# Patient Record
Sex: MALE | Race: WHITE | NOT HISPANIC OR LATINO | Employment: OTHER | URBAN - METROPOLITAN AREA
[De-identification: names, ages, dates, MRNs, and addresses within clinical notes are randomized per-mention and may not be internally consistent; named-entity substitution may affect disease eponyms.]

---

## 2019-05-31 ENCOUNTER — EVALUATION (OUTPATIENT)
Dept: PHYSICAL THERAPY | Facility: MEDICAL CENTER | Age: 54
End: 2019-05-31
Payer: COMMERCIAL

## 2019-05-31 ENCOUNTER — TRANSCRIBE ORDERS (OUTPATIENT)
Dept: PHYSICAL THERAPY | Facility: MEDICAL CENTER | Age: 54
End: 2019-05-31

## 2019-05-31 DIAGNOSIS — M25.511 RIGHT SHOULDER PAIN, UNSPECIFIED CHRONICITY: Primary | ICD-10-CM

## 2019-05-31 PROCEDURE — 97110 THERAPEUTIC EXERCISES: CPT | Performed by: PHYSICAL THERAPIST

## 2019-05-31 PROCEDURE — 97162 PT EVAL MOD COMPLEX 30 MIN: CPT | Performed by: PHYSICAL THERAPIST

## 2019-06-03 ENCOUNTER — OFFICE VISIT (OUTPATIENT)
Dept: PHYSICAL THERAPY | Facility: MEDICAL CENTER | Age: 54
End: 2019-06-03
Payer: COMMERCIAL

## 2019-06-03 DIAGNOSIS — M25.511 RIGHT SHOULDER PAIN, UNSPECIFIED CHRONICITY: Primary | ICD-10-CM

## 2019-06-03 PROCEDURE — 97112 NEUROMUSCULAR REEDUCATION: CPT

## 2019-06-03 PROCEDURE — 97110 THERAPEUTIC EXERCISES: CPT

## 2019-06-13 ENCOUNTER — OFFICE VISIT (OUTPATIENT)
Dept: PHYSICAL THERAPY | Facility: MEDICAL CENTER | Age: 54
End: 2019-06-13
Payer: COMMERCIAL

## 2019-06-13 DIAGNOSIS — M25.511 RIGHT SHOULDER PAIN, UNSPECIFIED CHRONICITY: Primary | ICD-10-CM

## 2019-06-13 PROCEDURE — 97112 NEUROMUSCULAR REEDUCATION: CPT

## 2019-06-13 PROCEDURE — 97110 THERAPEUTIC EXERCISES: CPT

## 2019-06-20 ENCOUNTER — OFFICE VISIT (OUTPATIENT)
Dept: PHYSICAL THERAPY | Facility: MEDICAL CENTER | Age: 54
End: 2019-06-20
Payer: COMMERCIAL

## 2019-06-20 DIAGNOSIS — M25.511 RIGHT SHOULDER PAIN, UNSPECIFIED CHRONICITY: Primary | ICD-10-CM

## 2019-06-20 PROCEDURE — 97112 NEUROMUSCULAR REEDUCATION: CPT

## 2019-06-20 PROCEDURE — 97110 THERAPEUTIC EXERCISES: CPT

## 2019-06-24 ENCOUNTER — OFFICE VISIT (OUTPATIENT)
Dept: PHYSICAL THERAPY | Facility: MEDICAL CENTER | Age: 54
End: 2019-06-24
Payer: COMMERCIAL

## 2019-06-24 DIAGNOSIS — M25.511 RIGHT SHOULDER PAIN, UNSPECIFIED CHRONICITY: Primary | ICD-10-CM

## 2019-06-24 PROCEDURE — 97110 THERAPEUTIC EXERCISES: CPT

## 2019-06-24 PROCEDURE — 97112 NEUROMUSCULAR REEDUCATION: CPT

## 2019-06-27 ENCOUNTER — OFFICE VISIT (OUTPATIENT)
Dept: PHYSICAL THERAPY | Facility: MEDICAL CENTER | Age: 54
End: 2019-06-27
Payer: COMMERCIAL

## 2019-06-27 DIAGNOSIS — M25.511 RIGHT SHOULDER PAIN, UNSPECIFIED CHRONICITY: Primary | ICD-10-CM

## 2019-06-27 PROCEDURE — 97110 THERAPEUTIC EXERCISES: CPT | Performed by: PHYSICAL THERAPIST

## 2019-06-27 PROCEDURE — 97112 NEUROMUSCULAR REEDUCATION: CPT | Performed by: PHYSICAL THERAPIST

## 2019-07-08 ENCOUNTER — OFFICE VISIT (OUTPATIENT)
Dept: PHYSICAL THERAPY | Facility: MEDICAL CENTER | Age: 54
End: 2019-07-08
Payer: COMMERCIAL

## 2019-07-08 DIAGNOSIS — M25.511 RIGHT SHOULDER PAIN, UNSPECIFIED CHRONICITY: Primary | ICD-10-CM

## 2019-07-08 PROCEDURE — 97112 NEUROMUSCULAR REEDUCATION: CPT | Performed by: PHYSICAL THERAPIST

## 2019-07-08 PROCEDURE — 97110 THERAPEUTIC EXERCISES: CPT | Performed by: PHYSICAL THERAPIST

## 2019-07-08 NOTE — PROGRESS NOTES
Daily Note     Today's date: 2019  Patient name: Pramod Timmons  : 1965  MRN: [id number]  Referring provider: Kemar Morris DO  Dx:   Encounter Diagnosis     ICD-10-CM    1  Right shoulder pain, unspecified chronicity M25 511          Subjective: Pt reports that he had some pain when he tried to skip rocks during vacation, but other than that his shoulder has been pain free  Objective: See treatment diary below      Assessment: Tolerated treatment well  Patient demonstrated fatigue post treatment, exhibited good technique with therapeutic exercises and would benefit from continued PT  Scap control is slowly improving  Plan: Continue per plan of care        Precautions: None    Daily Treatment Diary       Exercise Diary  5/31 6/3 6/13 6/20 6/24 6/27 7      UBE NV 3F/3B 3F/3B 3F/3B 3F/3B 3F/3B 3F/3B      Scap 4 IP Red  2x10 Red  3x12 Red  3x15 Green  3x10 Green  3x15 Green  3x15      UE alphabet NV 3x  supine 3x  supine 3x  Stand 3x  Stand  home NP  time x2      SL ER NV 3x10 3x12 3x15 1#  3x10 NP  time 2#  3x10      Prone pro/ret NV 3x10 3x10 3x15 3x10 3x10 3x10      Prone raises x3     2x10 3x15 3x10 3x10 3x12      Wall slides     Supine  home NP   time 3x10  stand

## 2019-07-10 ENCOUNTER — OFFICE VISIT (OUTPATIENT)
Dept: PHYSICAL THERAPY | Facility: MEDICAL CENTER | Age: 54
End: 2019-07-10
Payer: COMMERCIAL

## 2019-07-10 DIAGNOSIS — M25.511 RIGHT SHOULDER PAIN, UNSPECIFIED CHRONICITY: Primary | ICD-10-CM

## 2019-07-10 PROCEDURE — 97112 NEUROMUSCULAR REEDUCATION: CPT

## 2019-07-10 PROCEDURE — 97110 THERAPEUTIC EXERCISES: CPT

## 2019-07-10 NOTE — PROGRESS NOTES
Daily Note     Today's date: 7/10/2019  Patient name: Palak Ji  : 1965  MRN: [id number]  Referring provider: Shane Mckeon DO  Dx:   Encounter Diagnosis     ICD-10-CM    1  Right shoulder pain, unspecified chronicity M25 511                   Subjective: Pt reports that his shoulder is a little bit better, but is frustrated that he experienced increased pain when skipping a stone when on vacation  Pt admits that he has not been compliant with his ex's at home  Objective: See treatment diary below      Assessment: Tolerated treatment well  Patient demonstrated fatigue post treatment, exhibited good technique with therapeutic exercises and would benefit from continued PT  Advised pt to perform ex's at home as well for maximum benefit  Plan: Continue per plan of care        Precautions: None    Daily Treatment Diary       Exercise Diary  5/31 6/3 6/13 6/20 6/24 6/27 7/8 7/10     UBE NV 3F/3B 3F/3B 3F/3B 3F/3B 3F/3B 3F/3B 3F/3B     Scap 4 IP Red  2x10 Red  3x12 Red  3x15 Green  3x10 Green  3x15 Green  3x15 Green  3x15     UE alphabet NV 3x  supine 3x  supine 3x  Stand 3x  Stand  home NP  time x2 x3     SL ER NV 3x10 3x12 3x15 1#  3x10 NP  time 2#  3x10 2#  3x12     Prone pro/ret NV 3x10 3x10 3x15 3x10 3x10 3x10 3x12     Prone raises x3     2x10 3x15 3x10 3x10 3x12 3x12     Wall slides     Supine  home NP   time 3x10  stand 3x10  Stand

## 2019-07-15 ENCOUNTER — OFFICE VISIT (OUTPATIENT)
Dept: PHYSICAL THERAPY | Facility: MEDICAL CENTER | Age: 54
End: 2019-07-15
Payer: COMMERCIAL

## 2019-07-15 DIAGNOSIS — M25.511 RIGHT SHOULDER PAIN, UNSPECIFIED CHRONICITY: Primary | ICD-10-CM

## 2019-07-15 PROCEDURE — 97110 THERAPEUTIC EXERCISES: CPT

## 2019-07-15 PROCEDURE — 97112 NEUROMUSCULAR REEDUCATION: CPT

## 2019-07-15 NOTE — PROGRESS NOTES
Daily Note     Today's date: 7/15/2019  Patient name: Debbie Gomez  : 1965  MRN: [id number]  Referring provider: Jo Tomas DO  Dx:   Encounter Diagnosis     ICD-10-CM    1  Right shoulder pain, unspecified chronicity M25 511                   Subjective: Pt reports no changes in his shoulder since LV  Objective: See treatment diary below      Assessment: Tolerated treatment well  Patient demonstrated fatigue post treatment, exhibited good technique with therapeutic exercises and would benefit from continued PT  Occasional cuing required for scap mm activation with TBand ex's  Pt could not complete all ex's due to time constraints  Plan: Continue per plan of care        Precautions: None    Daily Treatment Diary       Exercise Diary  5/31 6/3 6/13 6/20 6/24 6/27 7/8 7/10 7/15    UBE NV 3F/3B 3F/3B 3F/3B 3F/3B 3F/3B 3F/3B 3F/3B 3F/3B    Scap 4 IP Red  2x10 Red  3x12 Red  3x15 Green  3x10 Green  3x15 Green  3x15 Green  3x15 Green  3x15    UE alphabet NV 3x  supine 3x  supine 3x  Stand 3x  Stand  home NP  time x2 x3 np    SL ER NV 3x10 3x12 3x15 1#  3x10 NP  time 2#  3x10 2#  3x12 2#  3x12    Prone pro/ret NV 3x10 3x10 3x15 3x10 3x10 3x10 3x12 3x12    Prone raises x3     2x10 3x15 3x10 3x10 3x12 3x12 3x12    Wall slides     Supine  home NP   time 3x10  stand 3x10  Stand np

## 2019-07-17 ENCOUNTER — OFFICE VISIT (OUTPATIENT)
Dept: PHYSICAL THERAPY | Facility: MEDICAL CENTER | Age: 54
End: 2019-07-17
Payer: COMMERCIAL

## 2019-07-17 DIAGNOSIS — M25.511 RIGHT SHOULDER PAIN, UNSPECIFIED CHRONICITY: Primary | ICD-10-CM

## 2019-07-17 PROCEDURE — 97112 NEUROMUSCULAR REEDUCATION: CPT

## 2019-07-17 PROCEDURE — 97110 THERAPEUTIC EXERCISES: CPT

## 2019-07-17 NOTE — PROGRESS NOTES
Daily Note     Today's date: 2019  Patient name: Josie Ronquillo  : 1965  MRN: [id number]  Referring provider: Louie Field DO  Dx:   Encounter Diagnosis     ICD-10-CM    1  Right shoulder pain, unspecified chronicity M25 511                   Subjective: Pt reports that his shoulder feels pretty good and has no new comments to offer  Objective: See treatment diary below      Assessment: Tolerated treatment well  Patient demonstrated fatigue post treatment, exhibited good technique with therapeutic exercises and would benefit from continued PT  Pt remains challenged w/current tx plan  Plan: Continue per plan of care        Precautions: None    Daily Treatment Diary       Exercise Diary  5/31 6/3 6/13 6/20 6/24 6/27 7/ 7/10 7/15 7/17   UBE NV 3F/3B 3F/3B 3F/3B 3F/3B 3F/3B 3F/3B 3F/3B 3F/3B 3F/3B   Scap 4 IP Red  2x10 Red  3x12 Red  3x15 Green  3x10 Green  3x15 Green  3x15 Green  3x15 Green  3x15 Green  3x15   UE alphabet NV 3x  supine 3x  supine 3x  Stand 3x  Stand  home NP  time x2 x3 np Stand  3x   SL ER NV 3x10 3x12 3x15 1#  3x10 NP  time 2#  3x10 2#  3x12 2#  3x12 2#  3x12   Prone pro/ret NV 3x10 3x10 3x15 3x10 3x10 3x10 3x12 3x12 3x12   Prone raises x3     2x10 3x15 3x10 3x10 3x12 3x12 3x12 3x12   Wall slides     Supine  home NP   time 3x10  stand 3x10  Stand np 3x10

## 2019-07-22 ENCOUNTER — OFFICE VISIT (OUTPATIENT)
Dept: PHYSICAL THERAPY | Facility: MEDICAL CENTER | Age: 54
End: 2019-07-22
Payer: COMMERCIAL

## 2019-07-22 DIAGNOSIS — M25.511 RIGHT SHOULDER PAIN, UNSPECIFIED CHRONICITY: Primary | ICD-10-CM

## 2019-07-22 PROCEDURE — 97110 THERAPEUTIC EXERCISES: CPT | Performed by: PHYSICAL THERAPIST

## 2019-07-22 PROCEDURE — 97112 NEUROMUSCULAR REEDUCATION: CPT | Performed by: PHYSICAL THERAPIST

## 2019-07-22 NOTE — PROGRESS NOTES
Daily Note     Today's date: 2019  Patient name: Kiah Palomo  : 1965  MRN: [id number]  Referring provider: Araceli Chavis DO  Dx:   Encounter Diagnosis     ICD-10-CM    1  Right shoulder pain, unspecified chronicity M25 511          Subjective: Pt reports that his R shoulder is sore  Pt admits to not doing his HEP and understands that his progress will be slow and he many never completely get rid of the soreness if he does not do his HEP  Objective: See treatment diary below      Assessment: Tolerated treatment well  Patient demonstrated fatigue post treatment, exhibited good technique with therapeutic exercises and would benefit from continued PT  Pt would benefit from doing his HEP more consistently  The likelihood of complete resolution of his sxs is decreased without consistent HEP performance  Plan: Continue per plan of care        Precautions: None    Daily Treatment Diary       Exercise Diary              UBE 3F/3B            Scap 4 Blue  3x10            UE alphabet 3X            SL ER 2#  3x15            Prone pro/ret 3x10            Prone raises x3   3x15            Wall slides 3x15

## 2019-07-24 ENCOUNTER — OFFICE VISIT (OUTPATIENT)
Dept: PHYSICAL THERAPY | Facility: MEDICAL CENTER | Age: 54
End: 2019-07-24
Payer: COMMERCIAL

## 2019-07-24 DIAGNOSIS — M25.511 RIGHT SHOULDER PAIN, UNSPECIFIED CHRONICITY: Primary | ICD-10-CM

## 2019-07-24 PROCEDURE — G8991 OTHER PT/OT GOAL STATUS: HCPCS

## 2019-07-24 PROCEDURE — 97110 THERAPEUTIC EXERCISES: CPT

## 2019-07-24 PROCEDURE — G8992 OTHER PT/OT  D/C STATUS: HCPCS

## 2019-07-24 PROCEDURE — 97112 NEUROMUSCULAR REEDUCATION: CPT

## 2019-07-24 NOTE — PROGRESS NOTES
Daily Note     Today's date: 2019  Patient name: Kiah Palomo  : 1965  MRN: [id number]  Referring provider: Araceli Chavis DO  Dx:   Encounter Diagnosis     ICD-10-CM    1  Right shoulder pain, unspecified chronicity M25 511                   Subjective: Pt reports that his shoulder is feeling pretty good and knows that he must continue his hep for continued results  Objective: See treatment diary below      Assessment: Tolerated treatment well  Patient demonstrated fatigue post treatment and exhibited good technique with therapeutic exercises      Plan: Pt dc'd to hep after todays visit        Precautions: None    Daily Treatment Diary       Exercise Diary             UBE 3F/3B 3F/3B           Scap 4 Blue  3x10 Blue  3x12           UE alphabet 3X 3x           SL ER 2#  3x15 2#  3x15           Prone pro/ret 3x10 3x10           Prone raises x3   3x15 3x15           Wall slides 3x15 3x15

## 2019-07-29 ENCOUNTER — APPOINTMENT (OUTPATIENT)
Dept: PHYSICAL THERAPY | Facility: MEDICAL CENTER | Age: 54
End: 2019-07-29
Payer: COMMERCIAL

## 2019-07-31 ENCOUNTER — APPOINTMENT (OUTPATIENT)
Dept: PHYSICAL THERAPY | Facility: MEDICAL CENTER | Age: 54
End: 2019-07-31
Payer: COMMERCIAL

## 2024-02-08 ENCOUNTER — OFFICE VISIT (OUTPATIENT)
Dept: URGENT CARE | Facility: MEDICAL CENTER | Age: 59
End: 2024-02-08
Payer: COMMERCIAL

## 2024-02-08 VITALS
HEART RATE: 72 BPM | SYSTOLIC BLOOD PRESSURE: 123 MMHG | TEMPERATURE: 98.2 F | DIASTOLIC BLOOD PRESSURE: 72 MMHG | OXYGEN SATURATION: 98 % | RESPIRATION RATE: 18 BRPM

## 2024-02-08 DIAGNOSIS — B96.89 BACTERIAL SINUSITIS: Primary | ICD-10-CM

## 2024-02-08 DIAGNOSIS — R05.1 ACUTE COUGH: ICD-10-CM

## 2024-02-08 DIAGNOSIS — J32.9 BACTERIAL SINUSITIS: Primary | ICD-10-CM

## 2024-02-08 PROCEDURE — 99213 OFFICE O/P EST LOW 20 MIN: CPT | Performed by: NURSE PRACTITIONER

## 2024-02-08 RX ORDER — CETIRIZINE HYDROCHLORIDE 5 MG/1
5 TABLET, CHEWABLE ORAL DAILY
COMMUNITY

## 2024-02-08 RX ORDER — FLUTICASONE PROPIONATE 50 MCG
1 SPRAY, SUSPENSION (ML) NASAL DAILY
COMMUNITY

## 2024-02-08 RX ORDER — BENZONATATE 200 MG/1
200 CAPSULE ORAL 3 TIMES DAILY PRN
Qty: 20 CAPSULE | Refills: 0 | Status: SHIPPED | OUTPATIENT
Start: 2024-02-08

## 2024-02-08 RX ORDER — AMOXICILLIN AND CLAVULANATE POTASSIUM 875; 125 MG/1; MG/1
1 TABLET, FILM COATED ORAL EVERY 12 HOURS SCHEDULED
Qty: 14 TABLET | Refills: 0 | Status: SHIPPED | OUTPATIENT
Start: 2024-02-08 | End: 2024-02-15

## 2024-02-08 NOTE — PATIENT INSTRUCTIONS
Take zyrtec or allegra daily  Use flonase 1-2 sprays in each nare daily   Use nasal saline to the nose,   Use humidifer in room  Symptoms worsen go to ER  Rest    Take antibiotic for your sinus infection   Continue symptomatic treatment   Take tessalon perles for cough.

## 2024-02-08 NOTE — PROGRESS NOTES
NAME: Ancelmo Taylor is a 58 y.o. male  : 1965    MRN: [id number]    /72   Pulse 72   Temp 98.2 °F (36.8 °C)   Resp 18   SpO2 98%     2:57 PM    Assessment and Plan   Bacterial sinusitis [J32.9, B96.89]  1. Bacterial sinusitis  amoxicillin-clavulanate (AUGMENTIN) 875-125 mg per tablet      2. Acute cough  benzonatate (TESSALON) 200 MG capsule          Ancelmo was seen today for cough.    Diagnoses and all orders for this visit:    Bacterial sinusitis  -     amoxicillin-clavulanate (AUGMENTIN) 875-125 mg per tablet; Take 1 tablet by mouth every 12 (twelve) hours for 7 days    Acute cough  -     benzonatate (TESSALON) 200 MG capsule; Take 1 capsule (200 mg total) by mouth 3 (three) times a day as needed for cough        Patient Instructions     Patient Instructions   Take zyrtec or allegra daily  Use flonase 1-2 sprays in each nare daily   Use nasal saline to the nose,   Use humidifer in room  Symptoms worsen go to ER  Rest    Take antibiotic for your sinus infection   Continue symptomatic treatment   Take tessalon perles for cough.      Proceed to the nearest ER if symptoms worsen, Follow up with your PCP  Continue to social distance, wash your hands, and wear your masks. Please continue to follow the CDC.gov guidelines daily for they are subject to change on COVID-19    Chief Complaint     Chief Complaint   Patient presents with    Cough     Pt states he had a headcold one week ago which improved but now  has sinus pressure with a productive cough for yellow sputum.  Self test for Covid negative last night.          History of Present Illness     58 yr old male here today for sinus pain, fever, cough and congestion. Pt has had a fever 3 days ago, took a covid test and was negative. Took zyrtec and Flonase daily. Continue to use tylenol and motrin for pain.  Denies CP, SOB, nausea or vomiting.             Review of Systems   Review of Systems   Constitutional:  Positive for fever. Negative for activity  change, appetite change, chills and fatigue.   HENT:  Positive for congestion, ear pain, postnasal drip, rhinorrhea, sinus pressure, sinus pain and sore throat. Negative for ear discharge and sneezing.    Eyes:  Negative for pain.   Respiratory:  Positive for cough. Negative for chest tightness, shortness of breath and wheezing.    Cardiovascular: Negative.    Gastrointestinal:  Negative for nausea.   Genitourinary: Negative.    Musculoskeletal: Negative.  Negative for neck pain.   Skin: Negative.    Allergic/Immunologic: Environmental allergies: Sinus.   Neurological:  Negative for headaches.   Psychiatric/Behavioral: Negative.           Current Medications       Current Outpatient Medications:     amoxicillin-clavulanate (AUGMENTIN) 875-125 mg per tablet, Take 1 tablet by mouth every 12 (twelve) hours for 7 days, Disp: 14 tablet, Rfl: 0    benzonatate (TESSALON) 200 MG capsule, Take 1 capsule (200 mg total) by mouth 3 (three) times a day as needed for cough, Disp: 20 capsule, Rfl: 0    cetirizine (ZyrTEC) 5 MG chewable tablet, Chew 5 mg daily, Disp: , Rfl:     fluticasone (FLONASE) 50 mcg/act nasal spray, 1 spray into each nostril daily, Disp: , Rfl:     fluorouracil (EFUDEX) 5 % cream, Apply topically 2 (two) times a day (Patient not taking: Reported on 2/8/2024), Disp: , Rfl:     Current Allergies     Allergies as of 02/08/2024    (No Known Allergies)              Past Medical History:   Diagnosis Date    Allergic rhinitis     Irregular heart beat        Past Surgical History:   Procedure Laterality Date    CARDIAC CATHETERIZATION      HERNIA REPAIR      KNEE SURGERY Left 1985 2015    NASAL TURBINATE REDUCTION      2013; [place]    SEPTOPLASTY      2013; [place]    SKIN CANCER EXCISION Left 02/2023    [facility]    THUMB FUSION         Family History   Problem Relation Age of Onset    Hyperlipidemia Mother     Osteopenia Mother     Hypertension Father     Hyperlipidemia Brother     Diabetes Paternal Grandmother      Diabetes Paternal Grandfather     Dementia Paternal Grandfather          Medications have been verified.    The following portions of the patient's history were reviewed and updated as appropriate: allergies, current medications, past family history, past medical history, past social history, past surgical history and problem list.    Objective   /72   Pulse 72   Temp 98.2 °F (36.8 °C)   Resp 18   SpO2 98%      Physical Exam     Physical Exam  Constitutional:       General: He is awake.      Appearance: He is well-developed. He is not ill-appearing.   HENT:      Head: Normocephalic.      Right Ear: Hearing, tympanic membrane, ear canal and external ear normal. There is no impacted cerumen.      Left Ear: Hearing, tympanic membrane, ear canal and external ear normal. There is no impacted cerumen.      Nose: Mucosal edema, congestion and rhinorrhea present.      Right Turbinates: Swollen.      Left Turbinates: Swollen.      Right Sinus: Maxillary sinus tenderness present.      Left Sinus: Maxillary sinus tenderness present.      Mouth/Throat:      Lips: Pink.      Mouth: Mucous membranes are moist.      Pharynx: Uvula midline. Posterior oropharyngeal erythema present. No oropharyngeal exudate.      Tonsils: No tonsillar exudate.   Eyes:      Pupils: Pupils are equal, round, and reactive to light.   Cardiovascular:      Rate and Rhythm: Normal rate and regular rhythm.      Heart sounds: Normal heart sounds. No murmur heard.  Pulmonary:      Effort: Pulmonary effort is normal.      Breath sounds: Normal breath sounds and air entry. No decreased breath sounds, wheezing, rhonchi or rales.   Musculoskeletal:         General: Normal range of motion.      Cervical back: Normal range of motion.   Skin:     General: Skin is warm.      Capillary Refill: Capillary refill takes less than 2 seconds.   Neurological:      General: No focal deficit present.      Mental Status: He is alert and oriented to person, place, and  "time.   Psychiatric:         Behavior: Behavior is cooperative.               Note: Portions of this record may have been created with voice recognition software. Occasional wrong word or \"sound a like\" substitutions may have occurred due to the inherent limitations of voice recognition software. Please read the chart carefully and recognize, using context, where substitutions have occurred.*    GIL Oden  "

## 2024-08-22 ENCOUNTER — TELEPHONE (OUTPATIENT)
Dept: ADMINISTRATIVE | Facility: OTHER | Age: 59
End: 2024-08-22

## 2024-08-22 ENCOUNTER — OFFICE VISIT (OUTPATIENT)
Dept: FAMILY MEDICINE CLINIC | Facility: CLINIC | Age: 59
End: 2024-08-22
Payer: COMMERCIAL

## 2024-08-22 VITALS
HEIGHT: 78 IN | BODY MASS INDEX: 21.64 KG/M2 | WEIGHT: 187 LBS | TEMPERATURE: 97.6 F | OXYGEN SATURATION: 99 % | RESPIRATION RATE: 20 BRPM | SYSTOLIC BLOOD PRESSURE: 128 MMHG | DIASTOLIC BLOOD PRESSURE: 62 MMHG | HEART RATE: 69 BPM

## 2024-08-22 DIAGNOSIS — H00.014 HORDEOLUM EXTERNUM OF LEFT UPPER EYELID: Primary | ICD-10-CM

## 2024-08-22 DIAGNOSIS — I63.20 CEREBROVASCULAR ACCIDENT (CVA) DUE TO OCCLUSION OF PRECEREBRAL ARTERY (HCC): ICD-10-CM

## 2024-08-22 DIAGNOSIS — Z12.5 SCREENING FOR PROSTATE CANCER: ICD-10-CM

## 2024-08-22 DIAGNOSIS — I47.29 NSVT (NONSUSTAINED VENTRICULAR TACHYCARDIA) (HCC): ICD-10-CM

## 2024-08-22 DIAGNOSIS — E53.8 B12 DEFICIENCY: ICD-10-CM

## 2024-08-22 PROBLEM — Q24.5 MYOCARDIAL BRIDGE: Status: RESOLVED | Noted: 2024-08-22 | Resolved: 2024-08-22

## 2024-08-22 PROBLEM — I63.40 CEREBROVASCULAR ACCIDENT (CVA) DUE TO EMBOLISM OF CEREBRAL ARTERY (HCC): Status: ACTIVE | Noted: 2024-08-22

## 2024-08-22 PROBLEM — I63.40 CEREBROVASCULAR ACCIDENT (CVA) DUE TO EMBOLISM OF CEREBRAL ARTERY (HCC): Status: RESOLVED | Noted: 2024-08-22 | Resolved: 2024-08-22

## 2024-08-22 PROBLEM — Q24.5 MYOCARDIAL BRIDGE: Status: ACTIVE | Noted: 2024-08-22

## 2024-08-22 PROCEDURE — 99204 OFFICE O/P NEW MOD 45 MIN: CPT | Performed by: FAMILY MEDICINE

## 2024-08-22 RX ORDER — POLYMYXIN B SULFATE AND TRIMETHOPRIM 1; 10000 MG/ML; [USP'U]/ML
1 SOLUTION OPHTHALMIC EVERY 6 HOURS
Qty: 10 ML | Refills: 0 | Status: SHIPPED | OUTPATIENT
Start: 2024-08-22 | End: 2024-08-29

## 2024-08-22 RX ORDER — LANOLIN ALCOHOL/MO/W.PET/CERES
1000 CREAM (GRAM) TOPICAL DAILY
COMMUNITY

## 2024-08-22 RX ORDER — ATORVASTATIN CALCIUM 80 MG/1
80 TABLET, FILM COATED ORAL DAILY
COMMUNITY

## 2024-08-22 RX ORDER — CLOPIDOGREL BISULFATE 75 MG/1
75 TABLET ORAL DAILY
COMMUNITY

## 2024-08-22 RX ORDER — ASPIRIN 81 MG/1
81 TABLET, CHEWABLE ORAL DAILY
COMMUNITY

## 2024-08-22 NOTE — LETTER
Procedure Request Form: Colonoscopy      Date Requested: 24  Patient: Ancelmo Taylor  Patient : 1965   Referring Provider: Isreal Chisholm MD        Date of Procedure ______________________________       The above patient has informed us that they have completed their   most recent Colonoscopy at your facility. Please complete   this form and attach all corresponding procedure reports/results.    Comments ______ DOS 2016 ___________________________________________  ____________________________________________________________________  ____________________________________________________________________  ____________________________________________________________________    Facility Completing Procedure _________________________________________    Form Completed By (print name) _______________________________________      Signature __________________________________________________________      These reports are needed for  compliance.    Please fax this completed form and a copy of the procedure report to our office located at [address] [place] [address] as soon as possible to Fax [phone] attention Massimo: Phone [phone]    We thank you for your assistance in treating our mutual patient.

## 2024-08-22 NOTE — TELEPHONE ENCOUNTER
----- Message from Nitesh RAYMOND sent at 8/22/2024 11:09 AM EDT -----  08/22/24 11:11 AM    Hello, our patient Ancelmo Taylor has had CRC: Colonoscopy completed/performed. Please assist in updating the patient chart by making an External outreach to [facility] facility located in [facility] [address] [place] [address]. The date of service is sometimes in 2016.    Thank you,  Nitesh Tristan MA  [facility]

## 2024-08-22 NOTE — PROGRESS NOTES
Ambulatory Visit  Name: Ancelmo Taylor      : 1965      MRN: [id number]  Encounter Provider: Isreal Chisholm MD  Encounter Date: 2024   Encounter department: [facility]    Assessment & Plan   1. Hordeolum externum of left upper eyelid  -     polymyxin b-trimethoprim (POLYTRIM) ophthalmic solution; Administer 1 drop to both eyes every 6 (six) hours for 7 days  2. Cerebrovascular accident (CVA) due to occlusion of precerebral artery (HCC)  Assessment & Plan:  Remain on current medicaiton, follow up neurology, follow up cardiology, recheck labs in 3 months  Orders:  -     Ambulatory Referral to Cardiology; Future  -     Lipid panel; Future; Expected date: 2024  -     Comprehensive metabolic panel; Future; Expected date: 2024  -     Hemoglobin A1C; Future; Expected date: 2024  3. NSVT (nonsustained ventricular tachycardia) (HCC)  Assessment & Plan:  Follows with cardiology, previous EP study negative  Orders:  -     Ambulatory Referral to Cardiology; Future  4. Screening for prostate cancer  -     PSA, Total Screen; Future  5. B12 deficiency  -     Vitamin B12; Future; Expected date: 2024         History of Present Illness     HPI    59 y.o. male with a PMH of Migraines and Atrial Tachycardia who presented to [facility] on 8/10/2024 9:47 AM with complaint of slurred speech, dizziness, right upper and lower extremity numbness and left ear pain.     He presented to the ED as a stroke alert. Upon arrival, his vitals were stable on room air. EKG revealed NSR. CBC and CMP were unremarkable.Troponins were negative. CT Head was negative. Neurology was consulted and he was started on ASA, Plavix, and Atorvastatin. MRI Brain with right superior cerebellum acute vs early subacute infarction with concern for associated artifact vs 3 mm degenerative hemorrhage. MRA Head/Neck revealed mild small vessel disease and concern for severe stenosis vs  occlusion of the right vertebral artery. ECHO was unremarkable. He was given an order for a 4 week Ziopatch.    Needs Zio patch after CVA, was prescribed by out of network cardioligst and denied by insurance    Since discahrarge, has noted speech and arm strength returning to normal.    He has noticed a stye and ischarge from his left eye for hte last month  Taking all medications as prescribed in hospital.    Review of Systems   Constitutional:  Negative for activity change and appetite change.   Respiratory:  Negative for apnea and chest tightness.    Cardiovascular:  Negative for chest pain and palpitations.   Gastrointestinal:  Negative for abdominal distention and abdominal pain.   Neurological:  Positive for dizziness.     Past Medical History:   Diagnosis Date    Allergic rhinitis     Irregular heart beat      Past Surgical History:   Procedure Laterality Date    CARDIAC CATHETERIZATION      HERNIA REPAIR      KNEE SURGERY Left 1985 2015    NASAL TURBINATE REDUCTION      2013; [place]    SEPTOPLASTY      2013; [place]    SKIN CANCER EXCISION Left 02/2023    [facility]    THUMB FUSION       Family History   Problem Relation Age of Onset    Hyperlipidemia Mother     Osteopenia Mother     Hypertension Father     Hyperlipidemia Brother     Diabetes Paternal Grandmother     Diabetes Paternal Grandfather     Dementia Paternal Grandfather      Social History     Tobacco Use    Smoking status: Never    Smokeless tobacco: Never   Vaping Use    Vaping status: Never Used   Substance and Sexual Activity    Alcohol use: Yes    Drug use: Never    Sexual activity: Not on file     Current Outpatient Medications on File Prior to Visit   Medication Sig    aspirin 81 mg chewable tablet Chew 81 mg daily    atorvastatin (LIPITOR) 80 mg tablet Take 80 mg by mouth daily    clopidogrel (PLAVIX) 75 mg tablet Take 75 mg by mouth daily    fluticasone (FLONASE) 50 mcg/act nasal spray 1 spray into each nostril daily    vitamin B-12 (VITAMIN  "B-12) 1,000 mcg tablet Take 1,000 mcg by mouth in the morning    [DISCONTINUED] cetirizine (ZyrTEC) 5 MG chewable tablet Chew 5 mg daily    [DISCONTINUED] benzonatate (TESSALON) 200 MG capsule Take 1 capsule (200 mg total) by mouth 3 (three) times a day as needed for cough    [DISCONTINUED] fluorouracil (EFUDEX) 5 % cream Apply topically 2 (two) times a day (Patient not taking: Reported on 2/8/2024)     No Known Allergies  Immunization History   Administered Date(s) Administered    COVID-19 MODERNA VACC 0.5 ML IM 03/17/2021, 04/14/2021, 11/29/2021, 08/01/2022     Objective     /62 (BP Location: Right arm, Patient Position: Sitting, Cuff Size: Standard)   Pulse 69   Temp 97.6 °F (36.4 °C) (Tympanic)   Resp 20   Ht 6' 11\" (2.108 m)   Wt 84.8 kg (187 lb)   SpO2 99%   BMI 19.08 kg/m²     Physical Exam  Constitutional:       Appearance: Normal appearance.   HENT:      Nose: Nose normal.   Eyes:      Pupils: Pupils are equal, round, and reactive to light.      Comments: Stye left lower eyelid   Cardiovascular:      Rate and Rhythm: Normal rate and regular rhythm.      Pulses: Normal pulses.      Heart sounds: Normal heart sounds.   Abdominal:      General: Abdomen is flat.      Tenderness: There is no abdominal tenderness.   Musculoskeletal:         General: Normal range of motion.   Skin:     General: Skin is warm.   Neurological:      General: No focal deficit present.      Mental Status: He is alert.         "

## 2024-08-22 NOTE — LETTER
Procedure Request Form: Colonoscopy      Date Requested: 24  Patient: Ancelmo Taylor  Patient : 1965   Referring Provider: Isreal Chisholm MD        Date of Procedure ______________________________       The above patient has informed us that they have completed their   most recent Colonoscopy at your facility. Please complete   this form and attach all corresponding procedure reports/results.    Comments ____________ DOS 2016 _____________________________________  ____________________________________________________________________  ____________________________________________________________________  ____________________________________________________________________    Facility Completing Procedure _________________________________________    Form Completed By (print name) _______________________________________      Signature __________________________________________________________      These reports are needed for  compliance.    Please fax this completed form and a copy of the procedure report to our office located at [address] [place] [address] as soon as possible to Fax [phone] attention Massimo: Phone [phone]    We thank you for your assistance in treating our mutual patient.

## 2024-08-22 NOTE — TELEPHONE ENCOUNTER
Upon review of the In Basket request and the patient's chart, initial outreach has been made via fax to facility. Please see Contacts section for details.     Thank you  Massimo Bassett MA

## 2024-08-29 NOTE — TELEPHONE ENCOUNTER
As a final attempt, a third outreach has been made via fax to facility. Please see Contacts section for details. This encounter will be closed and completed by end of day. Should we receive the requested information because of previous outreach attempts, the requested patient's chart will be updated appropriately.     Thank you  Massimo Bassett MA

## 2024-08-29 NOTE — TELEPHONE ENCOUNTER
As a follow-up, a second attempt has been made for outreach via telephone call to facility. Please see Contacts section for details.    Thank you  Massimo Bassett MA

## 2024-08-29 NOTE — TELEPHONE ENCOUNTER
Upon review of the In Basket request we were able to locate, review, and update the patient chart as requested for CRC: Colonoscopy.    Any additional questions or concerns should be emailed to the Practice Liaisons via the appropriate education email address, please do not reply via In Basket.    Thank you  Massimo Bassett MA   [facility]

## 2024-09-06 ENCOUNTER — TELEPHONE (OUTPATIENT)
Age: 59
End: 2024-09-06

## 2024-09-06 ENCOUNTER — NURSE TRIAGE (OUTPATIENT)
Age: 59
End: 2024-09-06

## 2024-09-06 DIAGNOSIS — I63.20 CEREBROVASCULAR ACCIDENT (CVA) DUE TO OCCLUSION OF PRECEREBRAL ARTERY (HCC): Primary | ICD-10-CM

## 2024-09-06 NOTE — TELEPHONE ENCOUNTER
"Called pt and states that he started plavix 75 mg daily 2 weeks ago. After a week of taking it, he noticed minor nosebleed. Few days ago, he noticed, nosebleed was getting worse. Needed a couple of tissues.   Lightheadedness and vertigo started 1 week before he started plavix. Lightheadedness comes and goes.  Vertigo gets better as soon as he does not turn his head. It takes a while like a couple of hours until he is back to his baseline.  Pt has NP appt w/ Dr Wallace on 10/2/24.  Pt wants to know Dr Wallace's recommendation    Answer Assessment - Initial Assessment Questions  1. AMOUNT OF BLEEDING:       - MILD: needed a couple tissues. Last about 3 min        2. ONSET: \"When did the nosebleed start?\"       2 weeks ago    3. FREQUENCY: \"How many nosebleeds have you had in the last 24 hours?\"       2    4. RECURRENT SYMPTOMS: \"Have there been other recent nosebleeds?\" If Yes, ask: \"How long did it take you to stop the bleeding?\" \"What worked best?\"       Denies    5. CAUSE: \"What do you think caused this nosebleed?\"      Possibly plavix    6. LOCAL FACTORS: \"Do you have any cold symptoms?\", \"Have you been rubbing or picking at your nose?\"      Denies    7. SYSTEMIC FACTORS: \"Do you have high blood pressure or any bleeding problems?\"      Denies    8. BLOOD THINNERS: \"Do you take any blood thinners?\" (e.g., coumadin, heparin, aspirin, Plavix)      Baby 81 mg and plavix 75 mg daily    9. OTHER SYMPTOMS: \"Do you have any other symptoms?\" (e.g., lightheadedness)      Vertigo it gets better when he does not turn his head and lightheadedness, it comes and goes. Both  symptoms started 3 weeks ago.     10. PREGNANCY: \"Is there any chance you are pregnant?\" \"When was your last menstrual period?\"        N/a    Protocols used: Nosebleed-ADULT-[place]      Ok to leave detailed message   "

## 2024-09-06 NOTE — TELEPHONE ENCOUNTER
Regarding: MEDICATION SIDE EFFECT  ----- Message from Keke VELIZ sent at 9/6/2024  9:27 AM EDT -----  09/06/24    Patient called office With Medication Side Effects.    Side Effects are the following:   Nosebleed and Vertigo.     Patient stated that side effects are happening since he started taking Medication that were prescribed at the ED 08/10/24.    Patient is scheduled to see Neurology for 10/02/24, 9:30 AM With Dr. Wallace at the [place] location.    Please Contact Patient.   Thank You.

## 2024-09-06 NOTE — TELEPHONE ENCOUNTER
Patient spoke to neuro regarding Plavix causing Vertigo and slight nose bleeds that last a few minutes long. They instructed him to speak to his PCP regarding this issue. Please advise.

## 2024-10-02 ENCOUNTER — PATIENT MESSAGE (OUTPATIENT)
Dept: NEUROLOGY | Facility: CLINIC | Age: 59
End: 2024-10-02

## 2024-10-02 ENCOUNTER — OFFICE VISIT (OUTPATIENT)
Dept: NEUROLOGY | Facility: CLINIC | Age: 59
End: 2024-10-02
Payer: COMMERCIAL

## 2024-10-02 VITALS
BODY MASS INDEX: 22.19 KG/M2 | TEMPERATURE: 97.8 F | HEART RATE: 75 BPM | DIASTOLIC BLOOD PRESSURE: 78 MMHG | SYSTOLIC BLOOD PRESSURE: 114 MMHG | OXYGEN SATURATION: 96 % | HEIGHT: 78 IN | WEIGHT: 191.8 LBS

## 2024-10-02 DIAGNOSIS — I47.29 NSVT (NONSUSTAINED VENTRICULAR TACHYCARDIA) (HCC): Primary | ICD-10-CM

## 2024-10-02 DIAGNOSIS — R06.83 SNORING: ICD-10-CM

## 2024-10-02 DIAGNOSIS — R53.83 FATIGUE: ICD-10-CM

## 2024-10-02 DIAGNOSIS — I65.09 VERTEBRAL ARTERY STENOSIS: ICD-10-CM

## 2024-10-02 DIAGNOSIS — I63.20 CEREBROVASCULAR ACCIDENT (CVA) DUE TO OCCLUSION OF PRECEREBRAL ARTERY (HCC): ICD-10-CM

## 2024-10-02 PROCEDURE — 99204 OFFICE O/P NEW MOD 45 MIN: CPT | Performed by: PSYCHIATRY & NEUROLOGY

## 2024-10-02 RX ORDER — VITAMIN B COMPLEX
1 CAPSULE ORAL DAILY
Qty: 30 CAPSULE | Refills: 3 | Status: SHIPPED | OUTPATIENT
Start: 2024-10-02

## 2024-10-02 RX ORDER — ATORVASTATIN CALCIUM 40 MG/1
40 TABLET, FILM COATED ORAL DAILY
Qty: 30 TABLET | Refills: 2 | Status: SHIPPED | OUTPATIENT
Start: 2024-10-02 | End: 2024-10-03 | Stop reason: SDUPTHER

## 2024-10-02 NOTE — ASSESSMENT & PLAN NOTE
-for secondary stroke prevention, recommend continuation of combination of aspirin, plavix 75mg Po qdaily and lipitor  -request imaging, and decide regarding dual antiplatelet therapy based on imaging, will likely d/c plavix if vertebral artery stenosis is more hypoplastic.   -Blood Pressure goal < 130/80, BP is at goal.   -LDL goal <70  -snoring - no TESSA  -will refer patient to cardiology for zio patch, and loop recorder placement     Counseling/stroke education -   -I advised patient to avoid using NSAIDs for headaches or other pain and to stick to tylenol if needed  -Recommend lifestyle modifications such as mediterranean diet & regular exercise regimen atleast 4-5 times a week for 20-30 minutes.   -I educated patient/family regarding medication compliance  -encourage control of diabetes and hypertension; defer management to primary   -refer to PT, OT   -cardiology referral  -sleep medicine referral  Orders:    Ambulatory Referral to Physical Therapy; Future    Ambulatory Referral to Occupational Therapy; Future    Ambulatory Referral to Cardiology; Future    Ambulatory Referral to Sleep Medicine; Future

## 2024-10-02 NOTE — PATIENT INSTRUCTIONS
request imaging, and decide regarding dual antiplatelet therapy based on imaging, will likely d/c plavix if vertebral artery stenosis is more hypoplastic.   Continue with aspirin   B complex vitamins and coenyzme q10 for fatigue

## 2024-10-02 NOTE — PROGRESS NOTES
Ambulatory Visit  Name: Ancelmo Taylor      : 1965      MRN: [id number]  Encounter Provider: Elo Wallace MD  Encounter Date: 10/2/2024   Encounter department: [facility]      Assessment & Plan  NSVT (nonsustained ventricular tachycardia) (HCC)         Cerebrovascular accident (CVA) due to occlusion of precerebral artery (HCC)  -for secondary stroke prevention, recommend continuation of combination of aspirin, plavix 75mg Po qdaily and lipitor  -request imaging, and decide regarding dual antiplatelet therapy based on imaging, will likely d/c plavix if vertebral artery stenosis is more hypoplastic.   -Blood Pressure goal < 130/80, BP is at goal.   -LDL goal <70  -snoring - no TESSA  -will refer patient to cardiology for zio patch, and loop recorder placement     Counseling/stroke education -   -I advised patient to avoid using NSAIDs for headaches or other pain and to stick to tylenol if needed  -Recommend lifestyle modifications such as mediterranean diet & regular exercise regimen atleast 4-5 times a week for 20-30 minutes.   -I educated patient/family regarding medication compliance  -encourage control of diabetes and hypertension; defer management to primary   -refer to PT, OT   -cardiology referral  -sleep medicine referral  Orders:    Ambulatory Referral to Physical Therapy; Future    Ambulatory Referral to Occupational Therapy; Future    Ambulatory Referral to Cardiology; Future    Ambulatory Referral to Sleep Medicine; Future    Vertebral artery stenosis  Could be hypoplastic vs stenosis  Continue with aspirin at this time        Fatigue  Likely 2/2 post stroke   Start patient on b complex vitamins, and coenzyme q10 supplements   Orders:    Ambulatory Referral to Physical Therapy; Future    Ambulatory Referral to Occupational Therapy; Future    Coenzyme Q10 10 MG capsule; Take 1 capsule (10 mg total) by mouth daily    b complex vitamins capsule; Take 1 capsule by mouth  daily      Snoring  Sleep medicine referral placed  Orders:    Ambulatory Referral to Sleep Medicine; Future      Follow up in 4 months     I would be happy to see the patient sooner if any new questions/concerns arise.  Patient/Guardian was advised to the call the office if they have any questions and concerns in the meantime.     Patient/Guardian does understand that if they have any new stroke like symptoms such as facial droop on one side, weakness/paralysis on either side, speech trouble, numbness on one side, balance issues, any vision changes, extreme dizziness or any new headache, to call 9-1-1 immediately or to proceed to the nearest ER immediately.      Patient Instructions   request imaging, and decide regarding dual antiplatelet therapy based on imaging, will likely d/c plavix if vertebral artery stenosis is more hypoplastic.   Continue with aspirin   B complex vitamins and coenyzme q10 for fatigue   History of Present Illness   HPI    This is a 60 y/o  Male who is here as a new patient to establish care with us.    He had stroke like symptoms - his right arm, was almost dancing, and he could not coordinate, and had movements that he could not coordinate or control, he then went to [facility].  CT Head was negative. Neurology was consulted and he was started on ASA, Plavix, and Atorvastatin. MRI Brain showed right superior cerebellum acute vs early subacute infarction with concern for associated artifact vs 3 mm degenerative hemorrhage. MRA Head/Neck revealed mild small vessel disease and concern for severe stenosis vs occlusion of the right vertebral artery. ECHO was unremarkable. He was given an order for a 4 week Ziopatch. He was seen by PT/OT and clear for discharge home. He was discharged on ASA and Plavix for 3 months with plan for ASA monotherapy after that. He was also given a prescription for Atorvastatin and B12 supplement since his B12 level was low. He was given a referral for Neurology  and advised to follow up with them     Patient is still having speech issues, and having motor issue, and he says that both arms are feeling weak now, and he says that he is feeling more tired and does have to take naps. Mood is ok, and sleep is also ok. He has no new TIA/CVA like symptoms, and is compliant w/ his medications.     Review of Systems   Constitutional:  Positive for fatigue.   HENT: Negative.     Eyes: Negative.    Respiratory: Negative.     Cardiovascular: Negative.    Gastrointestinal: Negative.    Endocrine: Negative.    Genitourinary: Negative.    Musculoskeletal: Negative.    Skin: Negative.    Allergic/Immunologic: Negative.    Neurological:  Positive for dizziness (vertigo) and weakness (hands).   Hematological: Negative.    Psychiatric/Behavioral:  The patient is nervous/anxious.    All other systems reviewed and are negative.  Irritated more often   I have personally reviewed the MA's review of systems and made changes as necessary.    Pertinent Medical History   Medical History Reviewed by provider this encounter:       Past Medical History   Past Medical History:   Diagnosis Date    Allergic rhinitis     Irregular heart beat      Past Surgical History:   Procedure Laterality Date    CARDIAC CATHETERIZATION      HERNIA REPAIR      KNEE SURGERY Left 1985 2015    NASAL TURBINATE REDUCTION      2013; [place]    SEPTOPLASTY      2013; [place]    SKIN CANCER EXCISION Left 02/2023    [facility]    THUMB FUSION       Family History   Problem Relation Age of Onset    Hyperlipidemia Mother     Osteopenia Mother     Hypertension Father     Hyperlipidemia Brother     Diabetes Paternal Grandmother     Diabetes Paternal Grandfather     Dementia Paternal Grandfather      Current Outpatient Medications on File Prior to Visit   Medication Sig Dispense Refill    aspirin 81 mg chewable tablet Chew 81 mg daily      clopidogrel (PLAVIX) 75 mg tablet Take 75 mg by mouth daily      fluticasone (FLONASE) 50 mcg/act nasal  "spray 1 spray into each nostril daily      vitamin B-12 (VITAMIN B-12) 1,000 mcg tablet Take 1,000 mcg by mouth in the morning       No current facility-administered medications on file prior to visit.   No Known Allergies   Current Outpatient Medications on File Prior to Visit   Medication Sig Dispense Refill    aspirin 81 mg chewable tablet Chew 81 mg daily      clopidogrel (PLAVIX) 75 mg tablet Take 75 mg by mouth daily      fluticasone (FLONASE) 50 mcg/act nasal spray 1 spray into each nostril daily      vitamin B-12 (VITAMIN B-12) 1,000 mcg tablet Take 1,000 mcg by mouth in the morning       No current facility-administered medications on file prior to visit.      Social History     Tobacco Use    Smoking status: Never    Smokeless tobacco: Never   Vaping Use    Vaping status: Never Used   Substance and Sexual Activity    Alcohol use: Yes    Drug use: Never    Sexual activity: Not on file     Objective     /78   Pulse 75   Temp 97.8 °F (36.6 °C) (Temporal)   Ht 6' 11\" (2.108 m)   Wt 87 kg (191 lb 12.8 oz)   SpO2 96%   BMI 19.57 kg/m²     Physical Exam  General - patient is alert   Speech - no dysarthria noted, no aphasia noted.     Neuro:   Cranial nerves: PERRL, EOMI, facial sensation intact to soft touch in V1, V2 and V3, no facial asymmetry noted, uvula/palate midline, tongue midline.   Motor: 5/5 throughout, normal tone, no pronator drift noted.   Sensory - intact to soft touch throughout  Reflexes - 2+ throughout  Coordination - no ataxia/dysmetria noted  Gait - normal    Neurologic Exam    Results/Data:  I have reviewed the results and images from the  in detail with the patient.        Administrative Statements   I have spent a total time of 55 minutes in caring for this patient on the day of the visit/encounter including Risks and benefits of tx options, Instructions for management, Counseling / Coordination of care, Documenting in the medical record, Reviewing / ordering tests, medicine, " procedures  , Obtaining or reviewing history  , and Communicating with other healthcare professionals .

## 2024-10-03 ENCOUNTER — PATIENT MESSAGE (OUTPATIENT)
Dept: NEUROLOGY | Facility: CLINIC | Age: 59
End: 2024-10-03

## 2024-10-03 DIAGNOSIS — I63.20 CEREBROVASCULAR ACCIDENT (CVA) DUE TO OCCLUSION OF PRECEREBRAL ARTERY (HCC): ICD-10-CM

## 2024-10-03 RX ORDER — ATORVASTATIN CALCIUM 10 MG/1
10 TABLET, FILM COATED ORAL DAILY
Qty: 30 TABLET | Refills: 3 | Status: SHIPPED | OUTPATIENT
Start: 2024-10-03 | End: 2024-10-08 | Stop reason: SDUPTHER

## 2024-10-04 ENCOUNTER — PATIENT MESSAGE (OUTPATIENT)
Dept: NEUROLOGY | Facility: CLINIC | Age: 59
End: 2024-10-04

## 2024-10-04 DIAGNOSIS — I63.20 CEREBROVASCULAR ACCIDENT (CVA) DUE TO OCCLUSION OF PRECEREBRAL ARTERY (HCC): ICD-10-CM

## 2024-10-08 RX ORDER — ATORVASTATIN CALCIUM 10 MG/1
10 TABLET, FILM COATED ORAL DAILY
Qty: 30 TABLET | Refills: 3 | Status: SHIPPED | OUTPATIENT
Start: 2024-10-08

## 2024-10-08 NOTE — PATIENT COMMUNICATION
I called patient and discussed we are unable to move up visit;  He verbalized understanding and said he was on the wait list and will see Dr. Martinez as recommended as other providers were scheduling in December as well.    Dr. Wallace, patient is asking if you were [facility]; said he signed form to get disc when he was in the office.  Also asking to redirect prescription for atorvastatin 10 mg to [facility], please sign if in agreement, thank you.

## 2024-11-04 ENCOUNTER — EVALUATION (OUTPATIENT)
Dept: OCCUPATIONAL THERAPY | Facility: REHABILITATION | Age: 59
End: 2024-11-04
Payer: COMMERCIAL

## 2024-11-04 DIAGNOSIS — R53.83 FATIGUE: ICD-10-CM

## 2024-11-04 DIAGNOSIS — I63.20 CEREBROVASCULAR ACCIDENT (CVA) DUE TO OCCLUSION OF PRECEREBRAL ARTERY (HCC): Primary | ICD-10-CM

## 2024-11-04 PROCEDURE — 97165 OT EVAL LOW COMPLEX 30 MIN: CPT | Performed by: OCCUPATIONAL THERAPIST

## 2024-11-04 NOTE — PROGRESS NOTES
"OCCUPATIONAL THERAPY INITIAL EVALUATION:    11/04/2024  Ancelmo Taylor  1965  [id number]  Elo Wallace MD   Diagnosis ICD-10-CM Associated Orders   1. Cerebrovascular accident (CVA) due to occlusion of precerebral artery (HCC)  I63.20 Ambulatory Referral to Occupational Therapy      2. Fatigue  R53.83 Ambulatory Referral to Occupational Therapy            Assessment    Assessment details: See skilled analysis for further details.         Skilled Analysis:  Pt is a 59 y.o. male referred to Occupational Therapy s/p Cerebrovascular accident (CVA) due to occlusion of precerebral artery (HCC) [I63.20].   Pt participated in skilled OT evaluation and following formalized testing as well as clinical observation, Pt does not need skilled OT services at this time.  Pt demo with significant improvements since CVA and performs daily tasks and work tasks at baseline abilities.  Pt will be undergoing a PT Initial evaluation to further assess occasional dizziness and imbalance. OTR educated Pt and Pt's wife on notifying staff if functional regression is noticed-- both demo with excellent understanding and in agreement to further plan.    Short Term Goals:  Pt does not need skilled OT services at this time.        Long Term Goals:  Pt does not need skilled OT services at this time.      Subjective Evaluation    Quality of life: fair          SUBJECTIVE: \"I am really improving since my stroke.\"    PATIENT GOAL: \"None at this time.\"    HISTORY OF PRESENT ILLNESS:     Pt is a 59 y.o. male who was referred to Occupational Therapy s/p  Cerebrovascular accident (CVA) due to occlusion of precerebral artery (HCC) [I63.20].   As per chart review, Pt with PMH of Migraines and Atrial Tachycardia who presented to [facility] on 8/10/2024 9:47 AM with complaint of slurred speech, dizziness, right upper and lower extremity numbness, lack of coordination of RUE, and left ear pain. He presented to the ED as a stroke alert. Upon " arrival, his vitals were stable on room air. EKG revealed NSR. CBC and CMP were unremarkable.Troponins were negative. CT Head was negative. Neurology was consulted and he was started on ASA, Plavix, and Atorvastatin. MRI Brain with right superior cerebellum acute vs early subacute infarction with concern for associated artifact vs 3 mm degenerative hemorrhage. MRA Head/Neck revealed mild small vessel disease and concern for severe stenosis vs occlusion of the right vertebral artery. ECHO was unremarkable. He was given an order for a 4 week Ziopatch. He was seen by PT/OT and clear for discharge home. He was discharged on ASA and Plavix for 3 months with plan for ASA monotherapy after that. He was also given a prescription for Atorvastatin and B12 supplement since his B12 level was low. He was given a referral for Neurology and advised to follow up with them. TT sent to  to assist patient with establishing care with a PCP and scheduling a follow up Neurology appointment.     Pt with self-report of noticing significant improvements for the past month with improved fluidity and legibility of handwriting.  Pt with self-report of noticing ongoing decreased endurance in BUE and imbalance.     Pt lives in a two story home with basement with wife and two children who come and go.  Pt currently performing ADLs/IADLs at independent status.  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  Pt works at [employer] 18-28 hours/week- Pt returned 6 days after CVA.  Pt continues the  role without difficulties reported.      PMH:   Past Medical History:   Diagnosis Date    Allergic rhinitis     Irregular heart beat          Pain Levels:     Restin    With Activity:  0    Objective     Functional Assessment        Comments  See impairment section for further details.       Impairment Observations:        MILES STOVER  Comments                 UPPER EXTREMITY FXN    Pt is R hand  dominant.                           /Pinch Strength           Dynamometer       - Gross Grasp 121 lbs 121 lbs  within normal limits   Pinch Meter        - PINCER 12 lbs 13 lbs  subnormal    - TRIPOD 20 lbs 21 lbs  subnormal    - LATERAL 19 lbs  20 lbs  subnormal               AROM (seated)        Elevation WFL WFL     Shoulder FF WFL  WFL      Shoulder Ext WFL  WFL      Shoulder Abd WFL  WFL      Shoulder Add WFL  WFL      Horizontal Abd WFL  WFL      Horizontal Add WFL  WFL      Elbow Flex WFL  WFL      Elbow Ext WFL  WFL      Pronation WFL  WFL      Supination WFL  WFL      Wrist Flex WFL  WFL      Wrist Ext WFL  WFL      Digit Flex WFL  WFL      Digit Ex WFL  WFL      Composite Grasp WFL  WFL      Hook Grasp WFL  WFL      Opposition WFL  WFL      Dysdiadochokinesia WFL WFL                          MMT           Shoulder FF 5/5 5/5     Shoulder Ext 5/5 5/5     Shoulder Abd 5/5 5/5     Shoulder ADd 5/5 5/5     Elbow Flex 5/5 5/5     Elbow Ext 5/5 5/5     Wrist Flex 5/5 5/5     Wrist Ext 5/5 5/5     SENSATION       Myofilaments (2.83 WNL) 2.83 2.83     Sharp Dull  Intact Intact     Proprioception Intact Intact     Hot/Cold Temp Intact Intact            COORDINATION       9 Hole Peg Test 17.40 seconds 22.19 seconds  within normal limits   Fxnl Dexterity Test 22.47 seconds 24.88 seconds  within normal limits   MODIFIED RASHARD SCALE (TONE)       No increase in muscle tone (0) 0 0     Slight Increase in muscle tone with catch and release or min resist at end range (1)       Slight Increase in muscle tone with catch and release, followed by min resistance through remainder of range (1+)       Increased muscle tone through full range, able to be moved easily (2)       Considerable increase in tone, difficult to move (3)       Rigid in Flexion/Extension (4)                                         OTHER PLANNED THERAPY INTERVENTIONS:   Skilled OT services not needed at this time.       INTERVENTION  COMMENTS:  Diagnosis: Cerebrovascular accident (CVA) due to occlusion of precerebral artery (HCC) [I63.20]  Precautions: Atrial Tachycardia  Ins[place]: [place] [employer] / [place] / Product Type: Medical Assistance Fee for Service /   1 of _______ visits

## 2024-11-05 ENCOUNTER — EVALUATION (OUTPATIENT)
Facility: REHABILITATION | Age: 59
End: 2024-11-05
Payer: COMMERCIAL

## 2024-11-05 DIAGNOSIS — I63.20 CEREBROVASCULAR ACCIDENT (CVA) DUE TO OCCLUSION OF PRECEREBRAL ARTERY (HCC): Primary | ICD-10-CM

## 2024-11-05 PROCEDURE — 97162 PT EVAL MOD COMPLEX 30 MIN: CPT | Performed by: PHYSICAL THERAPIST

## 2024-11-05 NOTE — PROGRESS NOTES
"PHYSICAL THERAPY EVALUATION     Date: 24  Name: Ancelmo Taylor  : 1965  Referring Provider: Elo Wallace MD  AUTHORIZATION:   Ins[place]: [place] [employer] / [place] / Product Type: Medical Assistance Fee for Service /     SUBJECTIVE:  HPI: Ancelmo Taylor is a 59 y.o. male referred to outpatient physical therapy for the following diagnosis   Encounter Diagnosis   Name Primary?    Cerebrovascular accident (CVA) due to occlusion of precerebral artery (HCC) Yes          From [facility] emergency department note 8/10/24:  \"This is a brief description of the patient's hospital stay; please refer to medical chart for further details. Ancelmo Taylor is a 59 y.o. male with a PMH of Migraines and Atrial Tachycardia who presented to [facility] on 8/10/2024 9:47 AM with complaint of slurred speech, dizziness, right upper and lower extremity numbness and left ear pain. He presented to the ED as a stroke alert. Upon arrival, his vitals were stable on room air. EKG revealed NSR. CBC and CMP were unremarkable.Troponins were negative. CT Head was negative. Neurology was consulted and he was started on ASA, Plavix, and Atorvastatin. MRI Brain with right superior cerebellum acute vs early subacute infarction with concern for associated artifact vs 3 mm degenerative hemorrhage. MRA Head/Neck revealed mild small vessel disease and concern for severe stenosis vs occlusion of the right vertebral artery. ECHO was u 0nremarkable. He was given an order for a 4 week Ziopatch. He was seen by PT/OT and clear for discharge home. He was discharged on ASA and Plavix for 3 months with plan for ASA monotherapy after that. He was also given a prescription for Atorvastatin and B12 supplement since his B12 level was low. He was given a referral for Neurology and advised to follow up with them. TT sent to  to assist patient with establishing care with a PCP and scheduling a follow up Neurology " "appointment.\"    Patient reports he was taking his wife to the neurology appointment, it was suggested to go to physical and occupational therapy.  In hospital, had consultation for both, but was discharged from both.    Has not tripped, fallen, nor feels like he'd fall.  Does get some dizziness turning to alarm clock laying in bed.  Had crystals in ear dislodged, was something different, where eyes were moving. Different from what's happening now.      Blood pressure a little on the low side.    Trying to get into cardiology to determine why he had the stroke.      Has returned to work, averaging about 25 hours a week.  Was retired, now works at [employer] store, doing everything he did before.         Past Medical History:   Diagnosis Date    Allergic rhinitis     Irregular heart beat        Current Outpatient Medications:     aspirin 81 mg chewable tablet, Chew 81 mg daily, Disp: , Rfl:     atorvastatin (LIPITOR) 10 mg tablet, Take 1 tablet (10 mg total) by mouth daily, Disp: 30 tablet, Rfl: 3    b complex vitamins capsule, Take 1 capsule by mouth daily, Disp: 30 capsule, Rfl: 3    clopidogrel (PLAVIX) 75 mg tablet, Take 75 mg by mouth daily, Disp: , Rfl:     Coenzyme Q10 10 MG capsule, Take 1 capsule (10 mg total) by mouth daily, Disp: 30 capsule, Rfl: 2    fluticasone (FLONASE) 50 mcg/act nasal spray, 1 spray into each nostril daily, Disp: , Rfl:     vitamin B-12 (VITAMIN B-12) 1,000 mcg tablet, Take 1,000 mcg by mouth in the morning, Disp: , Rfl:     OBJECTIVE:   Vitals     Blood pressure (resting, left arm unless noted) 106/56    Heart rate (resting) 69 bpm 95% SpO2       Oculomotor & Coordination     Smooth pursuit & conjugate gaze Normal    Fingertip to nose & rapidly alternating hand movements Normal     Bilateral upper extremity function within normal limits.  Full bilateral lower extremity range of motion     Static Balance     Romberg Normal; normal sharpened Romberg      Mobility Measures 11/05/24   5 Time " "Sit to Stand  (17\" chair, arms across chest) 7.1 seconds   3 Meter Timed  Up & Go 4.6 seconds   Walking speed (self-selected) 1.71 meters/second   Functional Gait Assessment (see below) 30/30   6 Minute Walk Test (100 foot circular course) 2120 feet    Ending heart rate 113 bpm    (!877 feet in age norm for 60-69 year old males)   Patient-Reported Outcome Measure: Activities-Specific Balance Confidence Scale (ABC Scale) 99%     Functional Gait Assessment  3/3 Gait level surface  3/3 Change in gait speed  3/3 Gait with horizontal head turns  3/3 Gait with vertical head turns  3/3 Gait and pivot turn  3/3 Step over obstacle  3/3 Gait with narrow base of support  3/3 Gait with eyes closed  3/3 Ambulating backwards  3/3 Steps  30/30 Total score (less than 22/30 indicates increased risk of fall)    Core Movement Tasks Description of task    Sitting Normal   Sitting to Standing Normal   Standing Normal   Walking Normal gait   Step-up Normal   Reach, grasp, manipulate Normal     Normal Nery-Hallpike bilaterally  Normal Roll Test and supine Roll tests bilaterally      ASSESSMENT:  Ancelmo is a 59 year old male who sustained a stroke 8/10/24.    He is scoring at normal limits with all coordination testing and mobility testing.    We tested for positional vertigo given history of positional vertigo and 1-2 second duration dizziness with rolling right to left.  Unable to provoke nystagmus.  1-2 seconds dizziness with one transition to sitting up.  BPPV is not present at this time.    PLAN OF CARE:  No physical therapy is indicated at this time.   Continue to manage blood pressure well. Follow with cardiology.   Look to incorporate aerobic and strengthening exercises over the long-term.        Pepito Renee, PT  11/5/2024    "

## 2024-11-13 ENCOUNTER — PATIENT MESSAGE (OUTPATIENT)
Dept: FAMILY MEDICINE CLINIC | Facility: CLINIC | Age: 59
End: 2024-11-13

## 2024-11-14 NOTE — PATIENT COMMUNICATION
Called pt l/m for him to call the office to scheduled a follow up visit to be re-evaluated for his current eye problem.

## 2024-11-15 ENCOUNTER — OFFICE VISIT (OUTPATIENT)
Dept: FAMILY MEDICINE CLINIC | Facility: CLINIC | Age: 59
End: 2024-11-15
Payer: COMMERCIAL

## 2024-11-15 VITALS
BODY MASS INDEX: 26.91 KG/M2 | SYSTOLIC BLOOD PRESSURE: 136 MMHG | HEART RATE: 74 BPM | WEIGHT: 192.2 LBS | OXYGEN SATURATION: 99 % | TEMPERATURE: 97.1 F | RESPIRATION RATE: 20 BRPM | HEIGHT: 71 IN | DIASTOLIC BLOOD PRESSURE: 78 MMHG

## 2024-11-15 DIAGNOSIS — H00.015 HORDEOLUM EXTERNUM OF LEFT LOWER EYELID: Primary | ICD-10-CM

## 2024-11-15 PROCEDURE — 99213 OFFICE O/P EST LOW 20 MIN: CPT | Performed by: FAMILY MEDICINE

## 2024-11-15 RX ORDER — ERYTHROMYCIN 5 MG/G
0.5 OINTMENT OPHTHALMIC
Qty: 3.5 G | Refills: 0 | Status: SHIPPED | OUTPATIENT
Start: 2024-11-15 | End: 2024-11-22

## 2024-11-15 NOTE — PROGRESS NOTES
"Name: Ancelmo Taylor      : 1965      MRN: [id number]  Encounter Provider: Isreal Chisholm MD  Encounter Date: 11/15/2024   Encounter department: [facility] PR[facility]  :  Assessment & Plan  Hordeolum externum of left lower eyelid    Never fully resolved on previous treatment, reprots 80 percent improvement before reoccuring will try erythromycin and refer to opthalmology for evaluation     Orders:    erythromycin (ILOTYCIN) ophthalmic ointment; Administer 0.5 inches to both eyes daily at bedtime for 7 days    Ambulatory Referral to Ophthalmology; Future           History of Present Illness     Eye Problem         59 year old male presenting for continued crusing and stye on both eyes worse on left.    No change in viison    Presenting for about 3 months      Review of Systems   Constitutional:  Negative for activity change and appetite change.   Respiratory:  Negative for apnea and chest tightness.    Cardiovascular:  Negative for chest pain and palpitations.   Gastrointestinal:  Negative for abdominal distention and abdominal pain.   Musculoskeletal:  Negative for arthralgias and back pain.          Objective   /78 (BP Location: Left arm, Patient Position: Sitting, Cuff Size: Standard)   Pulse 74   Temp (!) 97.1 °F (36.2 °C) (Tympanic)   Resp 20   Ht 5' 11\" (1.803 m)   Wt 87.2 kg (192 lb 3.2 oz)   SpO2 99%   BMI 26.81 kg/m²      Physical Exam  Constitutional:       Appearance: Normal appearance.   Eyes:      General:         Right eye: Discharge present.         Left eye: Discharge and hordeolum present.     Pupils: Pupils are equal, round, and reactive to light.   Cardiovascular:      Rate and Rhythm: Normal rate.      Pulses: Normal pulses.   Pulmonary:      Effort: Pulmonary effort is normal.   Neurological:      Mental Status: He is alert.         "

## 2024-12-09 ENCOUNTER — APPOINTMENT (OUTPATIENT)
Dept: LAB | Facility: MEDICAL CENTER | Age: 59
End: 2024-12-09
Payer: COMMERCIAL

## 2024-12-09 DIAGNOSIS — I63.20 CEREBROVASCULAR ACCIDENT (CVA) DUE TO OCCLUSION OF PRECEREBRAL ARTERY (HCC): ICD-10-CM

## 2024-12-09 DIAGNOSIS — Z12.5 SCREENING FOR PROSTATE CANCER: ICD-10-CM

## 2024-12-09 DIAGNOSIS — E53.8 B12 DEFICIENCY: ICD-10-CM

## 2024-12-09 LAB
ALBUMIN SERPL BCG-MCNC: 4.2 G/DL (ref 3.5–5)
ALP SERPL-CCNC: 55 U/L (ref 34–104)
ALT SERPL W P-5'-P-CCNC: 21 U/L (ref 7–52)
ANION GAP SERPL CALCULATED.3IONS-SCNC: 8 MMOL/L (ref 4–13)
AST SERPL W P-5'-P-CCNC: 21 U/L (ref 13–39)
BILIRUB SERPL-MCNC: 0.92 MG/DL (ref 0.2–1)
BUN SERPL-MCNC: 14 MG/DL (ref 5–25)
CALCIUM SERPL-MCNC: 9.1 MG/DL (ref 8.4–10.2)
CHLORIDE SERPL-SCNC: 105 MMOL/L (ref 96–108)
CHOLEST SERPL-MCNC: 136 MG/DL (ref ?–200)
CO2 SERPL-SCNC: 27 MMOL/L (ref 21–32)
CREAT SERPL-MCNC: 1.02 MG/DL (ref 0.6–1.3)
EST. AVERAGE GLUCOSE BLD GHB EST-MCNC: 114 MG/DL
GFR SERPL CREATININE-BSD FRML MDRD: 79 ML/MIN/1.73SQ M
GLUCOSE P FAST SERPL-MCNC: 105 MG/DL (ref 65–99)
HBA1C MFR BLD: 5.6 %
HDLC SERPL-MCNC: 44 MG/DL
LDLC SERPL CALC-MCNC: 76 MG/DL (ref 0–100)
NONHDLC SERPL-MCNC: 92 MG/DL
POTASSIUM SERPL-SCNC: 4.9 MMOL/L (ref 3.5–5.3)
PROT SERPL-MCNC: 6.3 G/DL (ref 6.4–8.4)
PSA SERPL-MCNC: 0.79 NG/ML (ref 0–4)
SODIUM SERPL-SCNC: 140 MMOL/L (ref 135–147)
TRIGL SERPL-MCNC: 80 MG/DL (ref ?–150)
VIT B12 SERPL-MCNC: 470 PG/ML (ref 180–914)

## 2024-12-09 PROCEDURE — 80053 COMPREHEN METABOLIC PANEL: CPT

## 2024-12-09 PROCEDURE — 80061 LIPID PANEL: CPT

## 2024-12-09 PROCEDURE — 82607 VITAMIN B-12: CPT

## 2024-12-09 PROCEDURE — 83036 HEMOGLOBIN GLYCOSYLATED A1C: CPT

## 2024-12-09 PROCEDURE — G0103 PSA SCREENING: HCPCS

## 2024-12-09 PROCEDURE — 36415 COLL VENOUS BLD VENIPUNCTURE: CPT

## 2024-12-18 ENCOUNTER — TELEPHONE (OUTPATIENT)
Dept: NEUROLOGY | Facility: CLINIC | Age: 59
End: 2024-12-18

## 2024-12-18 NOTE — TELEPHONE ENCOUNTER
Received via [employer] from [facility] [employer] request for medical records.  Request scanned into  and faxed to [employer].

## 2024-12-23 ENCOUNTER — OFFICE VISIT (OUTPATIENT)
Dept: FAMILY MEDICINE CLINIC | Facility: CLINIC | Age: 59
End: 2024-12-23
Payer: COMMERCIAL

## 2024-12-23 VITALS
SYSTOLIC BLOOD PRESSURE: 130 MMHG | OXYGEN SATURATION: 99 % | DIASTOLIC BLOOD PRESSURE: 78 MMHG | WEIGHT: 192.8 LBS | TEMPERATURE: 96.8 F | HEIGHT: 71 IN | HEART RATE: 72 BPM | RESPIRATION RATE: 18 BRPM | BODY MASS INDEX: 26.99 KG/M2

## 2024-12-23 DIAGNOSIS — I63.20 CEREBROVASCULAR ACCIDENT (CVA) DUE TO OCCLUSION OF PRECEREBRAL ARTERY (HCC): ICD-10-CM

## 2024-12-23 DIAGNOSIS — L98.9 SKIN LESION: ICD-10-CM

## 2024-12-23 DIAGNOSIS — Z00.00 ANNUAL PHYSICAL EXAM: ICD-10-CM

## 2024-12-23 DIAGNOSIS — Z12.5 PROSTATE CANCER SCREENING: Primary | ICD-10-CM

## 2024-12-23 DIAGNOSIS — L57.0 ACTINIC KERATOSIS: ICD-10-CM

## 2024-12-23 PROCEDURE — 99396 PREV VISIT EST AGE 40-64: CPT | Performed by: FAMILY MEDICINE

## 2024-12-23 NOTE — ASSESSMENT & PLAN NOTE
Currently following with neurology finished duel antiplatelets, now on aspirin, seeing cardiology in two weeks.    Orders:    CBC and differential; Future    Comprehensive metabolic panel; Future    Lipid panel; Future

## 2024-12-23 NOTE — PROGRESS NOTES
Adult Annual Physical  Name: Ancelmo Taylor      : 1965      MRN: [id number]  Encounter Provider: Isreal Chisholm MD  Encounter Date: 2024   Encounter department: [facility] PRIMARY CARE    Assessment & Plan  Annual physical exam         Cerebrovascular accident (CVA) due to occlusion of precerebral artery (HCC)    Currently following with neurology finished duel antiplatelets, now on aspirin, seeing cardiology in two weeks.    Orders:    CBC and differential; Future    Comprehensive metabolic panel; Future    Lipid panel; Future    Skin lesion    Lesion on chest not healing over 6 months, concern for  actinic keratosis, refer to dermatology, reports used fluoruracil for similar lesion in past    Orders:    Ambulatory referral to Dermatology; Future    Prostate cancer screening    Normal PSA recheck one year       Immunizations and preventive care screenings were discussed with patient today. Appropriate education was printed on patient's after visit summary.    Discussed risks and benefits of prostate cancer screening. We discussed the controversial history of PSA screening for prostate cancer in the [place] as well as the risk of over detection and over treatment of prostate cancer by way of PSA screening.  The patient understands that PSA blood testing is an imperfect way to screen for prostate cancer and that elevated PSA levels in the blood may also be caused by infection, inflammation, prostatic trauma or manipulation, urological procedures, or by benign prostatic enlargement.    The role of the digital rectal examination in prostate cancer screening was also discussed and I discussed with him that there is large interobserver variability in the findings of digital rectal examination.    Counseling:  Alcohol/drug use: discussed moderation in alcohol intake, the recommendations for healthy alcohol use, and avoidance of illicit drug use.  Dental Health: discussed  "importance of regular tooth brushing, flossing, and dental visits.  Injury prevention: discussed safety/seat belts, safety helmets, smoke detectors, carbon monoxide detectors, and smoking near bedding or upholstery.  Sexual health: discussed sexually transmitted diseases, partner selection, use of condoms, avoidance of unintended pregnancy, and contraceptive alternatives.  Exercise: the importance of regular exercise/physical activity was discussed. Recommend exercise 3-5 times per week for at least 30 minutes.          History of Present Illness     Adult Annual Physical    59 year old male presenting for annual physical.    Recovering well from CVA reports occasional memory diffiuclty.      Review of Systems   Constitutional:  Negative for chills and fever.   HENT:  Negative for ear pain and sore throat.    Eyes:  Negative for pain and visual disturbance.   Respiratory:  Negative for cough and shortness of breath.    Cardiovascular:  Negative for chest pain and palpitations.   Gastrointestinal:  Negative for abdominal pain and vomiting.   Genitourinary:  Negative for dysuria and hematuria.   Musculoskeletal:  Negative for arthralgias and back pain.   Skin:  Negative for color change and rash.   Neurological:  Negative for seizures and syncope.   All other systems reviewed and are negative.        Objective   /78 (BP Location: Left arm, Patient Position: Sitting, Cuff Size: Standard)   Pulse 72   Temp (!) 96.8 °F (36 °C) (Tympanic)   Resp 18   Ht 5' 11\" (1.803 m)   Wt 87.5 kg (192 lb 12.8 oz)   SpO2 99%   BMI 26.89 kg/m²     Physical Exam  Vitals and nursing note reviewed.   Constitutional:       General: He is not in acute distress.     Appearance: Normal appearance. He is well-developed.   HENT:      Head: Normocephalic and atraumatic.   Eyes:      Conjunctiva/sclera: Conjunctivae normal.   Cardiovascular:      Rate and Rhythm: Normal rate and regular rhythm.      Heart sounds: No murmur " heard.  Pulmonary:      Effort: Pulmonary effort is normal. No respiratory distress.      Breath sounds: Normal breath sounds.   Abdominal:      Palpations: Abdomen is soft.      Tenderness: There is no abdominal tenderness.   Musculoskeletal:         General: No swelling.      Cervical back: Neck supple.   Skin:     General: Skin is warm and dry.      Capillary Refill: Capillary refill takes less than 2 seconds.   Neurological:      General: No focal deficit present.      Mental Status: He is alert.      Cranial Nerves: No cranial nerve deficit.   Psychiatric:         Mood and Affect: Mood normal.

## 2024-12-24 RX ORDER — FLUOROURACIL 50 MG/G
CREAM TOPICAL 2 TIMES DAILY
Qty: 40 G | Refills: 0 | Status: SHIPPED | OUTPATIENT
Start: 2024-12-24 | End: 2025-01-07

## 2024-12-27 ENCOUNTER — PATIENT MESSAGE (OUTPATIENT)
Dept: FAMILY MEDICINE CLINIC | Facility: CLINIC | Age: 59
End: 2024-12-27

## 2024-12-27 DIAGNOSIS — L98.9 SKIN LESION: Primary | ICD-10-CM

## 2024-12-31 ENCOUNTER — TELEPHONE (OUTPATIENT)
Dept: NEUROLOGY | Facility: CLINIC | Age: 59
End: 2024-12-31

## 2025-01-06 ENCOUNTER — OFFICE VISIT (OUTPATIENT)
Dept: CARDIOLOGY CLINIC | Facility: CLINIC | Age: 60
End: 2025-01-06
Payer: COMMERCIAL

## 2025-01-06 VITALS
SYSTOLIC BLOOD PRESSURE: 104 MMHG | DIASTOLIC BLOOD PRESSURE: 62 MMHG | WEIGHT: 194 LBS | BODY MASS INDEX: 27.16 KG/M2 | HEIGHT: 71 IN

## 2025-01-06 DIAGNOSIS — I47.29 NSVT (NONSUSTAINED VENTRICULAR TACHYCARDIA) (HCC): Primary | ICD-10-CM

## 2025-01-06 DIAGNOSIS — I63.20 CEREBROVASCULAR ACCIDENT (CVA) DUE TO OCCLUSION OF PRECEREBRAL ARTERY (HCC): ICD-10-CM

## 2025-01-06 DIAGNOSIS — I65.09 VERTEBRAL ARTERY STENOSIS, UNSPECIFIED LATERALITY: ICD-10-CM

## 2025-01-06 PROCEDURE — 99243 OFF/OP CNSLTJ NEW/EST LOW 30: CPT | Performed by: INTERNAL MEDICINE

## 2025-01-06 NOTE — PROGRESS NOTES
EPS Consultation/New Patient Evaluation   Heart & Vascular Center  [facility] [address], [place] [address]    Name: Ancelmo Taylor  : 1965  MRN: [id number]    Assessment & Plan  Cerebrovascular accident (CVA) due to occlusion of precerebral artery (HCC)  Known prior history of CVA in 2024   Follows with neurology team and is maintained on ASA and Lipitor  Patient referred to cardiology for consideration of Zio/loop implantation to assist with ongoing stroke workup  NSVT (nonsustained ventricular tachycardia) (HCC)  Prior history noted  Has had negative EP study around   ECHO (2024) - EF 61-65%  Vertebral artery stenosis, unspecified laterality  Thought to be hypoplastic versus stenosis by neurology  Maintained on ASA as above       Discussion/Plan:    Patient was seen in office today for consultation regarding possible Zio placement vs loop implantation given prior history of CVA.    Since his stroke he reports he has been having ongoing fatigue, intermittent bilateral ankle swelling, and intermittent dizziness with certain head movements    His latest echo 2024 showed preserved EF with the above symptoms deemed less likely cardiac and more likely due to his recent CVA    From a cardiac standpoint, he does report an ongoing history of intermittent palpitations and episodes of heart racing for which she has followed with [facility] cardiology and had a negative EP study around     Interestingly, since his CVA he reports these palpitations have actually improved    We discussed the possibility of Zio monitor placement for around 30d vs implantation of a cardiac loop recorder.  In this particular situation the cardiac loop recorder would provide more data and a higher chance of any A-fib detection.    He is currently undergoing skin treatment of his chest for possible precancerous lesions and has developed resulting rash/blistering; so for now we will discuss  "his cardiac monitoring plan further with his neurology team before he is scheduled for any procedure.    Patient has been instructed to follow up in our EP office as needed. He will call our office with any questions or concerns in the meantime.    Rhythm History:   Atrial fibrillation:      Atrial flutter:      SVT:      VT/VF/PVC:     Device history:   Pacemaker:     Defibrillator:     BIV PPM:     BIV ICD:     ILR:    Chief Complaint: Prior history of CVA considering loop implantation for ongoing stroke evaluation    HPI:   Ancelmo Taylor is a 59 y.o. male with a PMH of CVA, NSVT, and vertebral artery stenosis.     This patient has a known history of vertebral artery stenosis and prior CVA for which she follows with neurology.  They referred the patient to cardiology for consideration of Zio/loop implantation for ongoing monitoring and stroke workup.    Currently He reports that since his stroke he has been having ongoing fatigue, intermittent bilateral ankle swelling, and intermittent dizziness with certain head movements.  From a cardiac standpoint he does report ongoing history of intermittent palpitations and episodes of heart racing, which interestingly have improved post CVA.    EKG: No EKG obtained in office today given patient reports skin discomfort/blistering on the chest due to ongoing dermatologic treatments      Review of Systems   Constitutional:  Negative for activity change, appetite change, chills, fatigue and fever.   HENT:  Negative for nosebleeds.    Respiratory:  Negative for chest tightness and shortness of breath.    Cardiovascular:  Negative for chest pain, palpitations and leg swelling.   Neurological:  Negative for dizziness, syncope, weakness and light-headedness.       Objective:     Vitals: Blood pressure 104/62, height 5' 11\" (1.803 m), weight 88 kg (194 lb)., Body mass index is 27.06 kg/m².,        Physical Exam:   Physical Exam  Constitutional:       General: He is not in acute " distress.     Appearance: Normal appearance. He is not toxic-appearing.   HENT:      Head: Normocephalic and atraumatic.   Eyes:      General:         Right eye: No discharge.         Left eye: No discharge.   Cardiovascular:      Rate and Rhythm: Normal rate and regular rhythm.      Pulses: Normal pulses.   Pulmonary:      Effort: Pulmonary effort is normal.      Breath sounds: Normal breath sounds.   Musculoskeletal:      Right lower leg: No edema.      Left lower leg: No edema.   Skin:     General: Skin is warm and dry.      Capillary Refill: Capillary refill takes less than 2 seconds.   Neurological:      Mental Status: He is alert.            Medications:      Current Outpatient Medications:     aspirin 81 mg chewable tablet, Chew 81 mg daily, Disp: , Rfl:     atorvastatin (LIPITOR) 10 mg tablet, Take 1 tablet (10 mg total) by mouth daily, Disp: 30 tablet, Rfl: 3    b complex vitamins capsule, Take 1 capsule by mouth daily, Disp: 30 capsule, Rfl: 3    fluorouracil (EFUDEX) 5 % cream, Apply topically 2 (two) times a day for 14 days, Disp: 40 g, Rfl: 0    fluticasone (FLONASE) 50 mcg/act nasal spray, 1 spray into each nostril daily, Disp: , Rfl:     Coenzyme Q10 10 MG capsule, Take 1 capsule (10 mg total) by mouth daily (Patient not taking: Reported on 1/6/2025), Disp: 30 capsule, Rfl: 2    vitamin B-12 (VITAMIN B-12) 1,000 mcg tablet, Take 1,000 mcg by mouth in the morning, Disp: , Rfl:        Historical Information   Past Medical History:   Diagnosis Date    Allergic 1976    Seasonal    Allergic rhinitis     Cancer (HCC) 2021    Left forearm and surgical removal    Irregular heart beat     Stroke (HCC) 8/10/2024    CVA       Past Surgical History:   Procedure Laterality Date    CARDIAC CATHETERIZATION      HERNIA REPAIR      KNEE SURGERY Left 1985 2015    NASAL TURBINATE REDUCTION      2013; [place]    SEPTOPLASTY      2013; [place]    SKIN CANCER EXCISION Left 02/2023    [facility]    THUMB FUSION         Social  History     Substance and Sexual Activity   Alcohol Use Yes    Alcohol/week: 2.0 - 4.0 standard drinks of alcohol    Types: 2 - 4 Standard drinks or equivalent per week     Social History     Substance and Sexual Activity   Drug Use Never     Social History     Tobacco Use   Smoking Status Never   Smokeless Tobacco Never       Family History   Problem Relation Age of Onset    Hyperlipidemia Mother     Osteopenia Mother     Hypertension Father     Hyperlipidemia Brother     Diabetes Paternal Grandmother     Diabetes Paternal Grandfather     Dementia Paternal Grandfather          Labs & Results:  Below is the patient's most recent value for Albumin, ALT, AST, BUN, Calcium, Chloride, Cholesterol, CO2, Creatinine, GFR, Glucose, HDL, Hematocrit, Hemoglobin, Hemoglobin A1C, LDL, Magnesium, Phosphorus, Platelets, Potassium, PSA, Sodium, Triglycerides, and WBC.   Lab Results   Component Value Date    ALT 21 12/09/2024    AST 21 12/09/2024    BUN 14 12/09/2024    CALCIUM 9.1 12/09/2024     12/09/2024    CO2 27 12/09/2024    CREATININE 1.02 12/09/2024    HDL 44 12/09/2024    HGBA1C 5.6 12/09/2024    MG 2.4 (H) 03/09/2020    K 4.9 12/09/2024    PSA 0.788 12/09/2024    TRIG 80 12/09/2024     Note: for a comprehensive list of the patient's lab results, access the Results Review activity.

## 2025-01-06 NOTE — ASSESSMENT & PLAN NOTE
Known prior history of CVA in 08/2024   Follows with neurology team and is maintained on ASA and Lipitor  Patient referred to cardiology for consideration of Zio/loop implantation to assist with ongoing stroke workup

## 2025-02-05 ENCOUNTER — TELEPHONE (OUTPATIENT)
Dept: NEUROLOGY | Facility: CLINIC | Age: 60
End: 2025-02-05

## 2025-02-05 NOTE — TELEPHONE ENCOUNTER
Called pt to r/s appt due to provider being offsite offered 3/13 @11:30 pt agreed and is now scheduled

## 2025-03-13 ENCOUNTER — OFFICE VISIT (OUTPATIENT)
Dept: NEUROLOGY | Facility: CLINIC | Age: 60
End: 2025-03-13
Payer: COMMERCIAL

## 2025-03-13 VITALS
OXYGEN SATURATION: 97 % | BODY MASS INDEX: 26.68 KG/M2 | HEART RATE: 58 BPM | SYSTOLIC BLOOD PRESSURE: 116 MMHG | DIASTOLIC BLOOD PRESSURE: 80 MMHG | HEIGHT: 71 IN | WEIGHT: 190.6 LBS | TEMPERATURE: 98 F

## 2025-03-13 DIAGNOSIS — R47.9 SPEECH ABNORMALITY: ICD-10-CM

## 2025-03-13 DIAGNOSIS — R20.2 NUMBNESS AND TINGLING OF RIGHT ARM: ICD-10-CM

## 2025-03-13 DIAGNOSIS — I63.20 CEREBROVASCULAR ACCIDENT (CVA) DUE TO OCCLUSION OF PRECEREBRAL ARTERY (HCC): ICD-10-CM

## 2025-03-13 DIAGNOSIS — R20.0 NUMBNESS AND TINGLING OF RIGHT ARM: ICD-10-CM

## 2025-03-13 DIAGNOSIS — Z86.73 HISTORY OF CVA (CEREBROVASCULAR ACCIDENT): Primary | ICD-10-CM

## 2025-03-13 PROBLEM — R53.83 FATIGUE: Status: ACTIVE | Noted: 2025-03-13

## 2025-03-13 PROCEDURE — 99215 OFFICE O/P EST HI 40 MIN: CPT | Performed by: PSYCHIATRY & NEUROLOGY

## 2025-03-13 NOTE — ASSESSMENT & PLAN NOTE
"     Follow up in 6 months     I would be happy to see the patient sooner if any new questions/concerns arise.  Patient/Guardian was advised to the call the office if they have any questions and concerns in the meantime.     We discussed \"red flag\" headache symptoms including symptoms such as facial droop on one side, weakness/paralysis on either side, speech trouble, numbness on one side, balance issues, any vision changes, extreme dizziness or significant increase in severity of headache or a sudden onset severe headache, patient is to call 9-1-1 immediately or to proceed to the nearest ER.     "

## 2025-03-13 NOTE — ASSESSMENT & PLAN NOTE
Secondary Prevention -   -for medications - recommend continuation of combination of aspirin, d/c plavix and atorvastatin  -Blood Pressure goal < 130/80, BP is at goal   -LDL goal <70, last LDL is 76  -sleep risk factors - no snoring  -he wants to hold off on loop recorder, is considering patch and is waiting to hear back from cardiology    Counseling/stroke education -   -I advised patient to avoid using NSAIDs for headaches or other pain and to stick to tylenol if needed  -Recommend lifestyle modifications such as mediterranean diet & regular exercise regimen atleast 4-5 times a week for 20-30 minutes.   -I educated patient/family regarding medication compliance  -encourage control of diabetes and hypertension; defer management to primary

## 2025-03-13 NOTE — PROGRESS NOTES
"Name: Ancelmo Taylor      : 1965      MRN: [id number]  Encounter Provider: Elo Wallace MD  Encounter Date: 3/13/2025   Encounter department: [facility] [place]  :  Assessment & Plan  History of CVA (cerebrovascular accident)  Secondary Prevention -   -for medications - recommend continuation of combination of aspirin, d/c plavix and atorvastatin  -Blood Pressure goal < 130/80, BP is at goal   -LDL goal <70, last LDL is 76  -sleep risk factors - no snoring  -he wants to hold off on loop recorder, is considering patch and is waiting to hear back from cardiology    Counseling/stroke education -   -I advised patient to avoid using NSAIDs for headaches or other pain and to stick to tylenol if needed  -Recommend lifestyle modifications such as mediterranean diet & regular exercise regimen atleast 4-5 times a week for 20-30 minutes.   -I educated patient/family regarding medication compliance  -encourage control of diabetes and hypertension; defer management to primary        Cerebrovascular accident (CVA) due to occlusion of precerebral artery (HCC)       Follow up in 6 months     I would be happy to see the patient sooner if any new questions/concerns arise.  Patient/Guardian was advised to the call the office if they have any questions and concerns in the meantime.     We discussed \"red flag\" headache symptoms including symptoms such as facial droop on one side, weakness/paralysis on either side, speech trouble, numbness on one side, balance issues, any vision changes, extreme dizziness or significant increase in severity of headache or a sudden onset severe headache, patient is to call  immediately or to proceed to the nearest ER.           History of Present Illness   HPI   Review of Systems   Constitutional: Negative.    HENT: Negative.     Eyes: Negative.    Respiratory: Negative.     Cardiovascular: Negative.    Gastrointestinal: Negative.    Endocrine: Negative.  " "  Genitourinary: Negative.    Musculoskeletal: Negative.    Skin: Negative.    Allergic/Immunologic: Negative.    Neurological: Negative.    Hematological: Negative.    Psychiatric/Behavioral: Negative.     All other systems reviewed and are negative.   I have personally reviewed the MA's review of systems and made changes as necessary.         Objective   /80 (Patient Position: Sitting, Cuff Size: Standard)   Pulse 58   Temp 98 °F (36.7 °C) (Temporal)   Ht 5' 11\" (1.803 m)   Wt 86.5 kg (190 lb 9.6 oz)   SpO2 97%   BMI 26.58 kg/m²     Physical Exam  General - patient is alert   Speech - no dysarthria noted, no aphasia noted.     Neuro:   Cranial nerves: PERRL, EOMI, facial sensation intact to soft touch in V1, V2 and V3, no facial asymmetry noted, uvula/palate midline, tongue midline.   Motor: 5/5 throughout, normal tone, no pronator drift noted.   Sensory - intact to soft touch throughout  Reflexes - 2+ throughout  Coordination - no ataxia/dysmetria noted  Gait - normal    Neurological Exam        Administrative Statements   I have spent a total time of 40 minutes in caring for this patient on the day of the visit/encounter including Risks and benefits of tx options, Instructions for management, Counseling / Coordination of care, Documenting in the medical record, Reviewing/placing orders in the medical record (including tests, medications, and/or procedures), Obtaining or reviewing history  , and Communicating with other healthcare professionals .  "

## 2025-03-24 ENCOUNTER — TELEPHONE (OUTPATIENT)
Age: 60
End: 2025-03-24

## 2025-03-24 NOTE — TELEPHONE ENCOUNTER
Pt called in to see if Dr Wallace has spoken to his Cardiologist since his LOV 3/13/25  regarding treatment plan as he has not received an update from either our office or his cardiologist office either.    Pt requesting a call back at , may leave a detailed msg.

## 2025-03-31 DIAGNOSIS — I63.20 CEREBROVASCULAR ACCIDENT (CVA) DUE TO OCCLUSION OF PRECEREBRAL ARTERY (HCC): ICD-10-CM

## 2025-03-31 NOTE — TELEPHONE ENCOUNTER
Patient still has not received an update regarding the loop recorder. Patient is requesting a follow up phone call at #[phone]

## 2025-03-31 NOTE — TELEPHONE ENCOUNTER
Reason for call:   [x] Refill   [] Prior Auth  [] Other:     Office:   [x] Specialty/Provider - neuro / [place]    Medication: atorvastatin    Dose/Frequency: 10mg qd    Quantity: 90    Pharmacy: [id number] - [place] - [address] [place] [address]     Local Pharmacy   Does the patient have enough for 3 days?   [x] Yes   [] No - Send as HP to POD    Mail Away Pharmacy   Does the patient have enough for 10 days?   [] Yes   [] No - Send as HP to POD

## 2025-03-31 NOTE — TELEPHONE ENCOUNTER
Called to notify of advice above pt a agreed to call cardiologist office to set up an appt to discuss loop recorder, thank you.

## 2025-04-01 RX ORDER — ATORVASTATIN CALCIUM 10 MG/1
10 TABLET, FILM COATED ORAL DAILY
Qty: 90 TABLET | Refills: 1 | Status: SHIPPED | OUTPATIENT
Start: 2025-04-01

## 2025-04-07 ENCOUNTER — TELEPHONE (OUTPATIENT)
Age: 60
End: 2025-04-07

## 2025-04-07 ENCOUNTER — TELEPHONE (OUTPATIENT)
Dept: NON INVASIVE DIAGNOSTICS | Facility: CLINIC | Age: 60
End: 2025-04-07

## 2025-04-07 DIAGNOSIS — I47.29 NSVT (NONSUSTAINED VENTRICULAR TACHYCARDIA) (HCC): Primary | ICD-10-CM

## 2025-04-07 NOTE — TELEPHONE ENCOUNTER
Patient had f/u with neurology on 3/13 and they are in support of the patient having a monitor placed.   Patient would like monitor mailed to him.  Patient does not require prior auth or Zio AT or Zio XT.    Which monitor would you like and for how long?

## 2025-04-07 NOTE — TELEPHONE ENCOUNTER
Monitors ordered and patient made aware that 2 will be delivered to his home and provided number to office if any delays in receiving and if he has any questions or concerns.

## 2025-04-07 NOTE — TELEPHONE ENCOUNTER
Received a call from Ancelmo, and looking for information about getting the Zio patch, contacted Alley at  and warm transferred.

## 2025-06-02 ENCOUNTER — RESULTS FOLLOW-UP (OUTPATIENT)
Dept: CARDIOLOGY CLINIC | Facility: CLINIC | Age: 60
End: 2025-06-02